# Patient Record
Sex: FEMALE | Race: WHITE | NOT HISPANIC OR LATINO | URBAN - METROPOLITAN AREA
[De-identification: names, ages, dates, MRNs, and addresses within clinical notes are randomized per-mention and may not be internally consistent; named-entity substitution may affect disease eponyms.]

---

## 2017-12-18 ENCOUNTER — AMBULATORY - HEALTHEAST (OUTPATIENT)
Dept: ADMINISTRATIVE | Facility: CLINIC | Age: 82
End: 2017-12-18

## 2017-12-18 RX ORDER — ASPIRIN 325 MG
325 TABLET, DELAYED RELEASE (ENTERIC COATED) ORAL DAILY
Status: SHIPPED | COMMUNITY
Start: 2017-12-16

## 2017-12-18 RX ORDER — ACETAMINOPHEN 325 MG/1
650 TABLET ORAL EVERY 4 HOURS PRN
Status: SHIPPED | COMMUNITY
Start: 2017-12-15

## 2017-12-18 RX ORDER — LIDOCAINE 50 MG/G
1 PATCH TOPICAL 2 TIMES DAILY
Status: SHIPPED | COMMUNITY
Start: 2017-12-16

## 2017-12-18 RX ORDER — DILTIAZEM HYDROCHLORIDE 120 MG/1
120 CAPSULE, COATED, EXTENDED RELEASE ORAL DAILY
Status: SHIPPED | COMMUNITY
Start: 2017-12-16

## 2017-12-18 RX ORDER — PANTOPRAZOLE SODIUM 40 MG/1
40 TABLET, DELAYED RELEASE ORAL DAILY
Status: SHIPPED | COMMUNITY
Start: 2017-12-16

## 2017-12-18 RX ORDER — DEXTROMETHORPHAN HBR. AND GUAIFENESIN 10; 100 MG/5ML; MG/5ML
10 SOLUTION ORAL EVERY 4 HOURS PRN
Status: SHIPPED | COMMUNITY
Start: 2017-12-15

## 2017-12-18 RX ORDER — I-VITE, TAB 1000-60-2MG (60/BT) 300MCG-200
1 TAB ORAL DAILY
Status: SHIPPED | COMMUNITY
Start: 2017-12-15

## 2017-12-19 ENCOUNTER — OFFICE VISIT - HEALTHEAST (OUTPATIENT)
Dept: GERIATRICS | Facility: CLINIC | Age: 82
End: 2017-12-19

## 2017-12-19 DIAGNOSIS — J96.90 RESPIRATORY FAILURE (H): ICD-10-CM

## 2017-12-19 DIAGNOSIS — I10 HTN (HYPERTENSION): ICD-10-CM

## 2017-12-19 DIAGNOSIS — I48.91 A-FIB (H): ICD-10-CM

## 2017-12-19 DIAGNOSIS — J44.9 COPD (CHRONIC OBSTRUCTIVE PULMONARY DISEASE) (H): ICD-10-CM

## 2017-12-26 ENCOUNTER — OFFICE VISIT - HEALTHEAST (OUTPATIENT)
Dept: GERIATRICS | Facility: CLINIC | Age: 82
End: 2017-12-26

## 2017-12-26 DIAGNOSIS — J18.9 PNEUMONIA: ICD-10-CM

## 2017-12-26 DIAGNOSIS — I10 HTN (HYPERTENSION): ICD-10-CM

## 2017-12-26 DIAGNOSIS — I48.91 A-FIB (H): ICD-10-CM

## 2017-12-26 DIAGNOSIS — J44.9 COPD (CHRONIC OBSTRUCTIVE PULMONARY DISEASE) (H): ICD-10-CM

## 2017-12-29 ENCOUNTER — OFFICE VISIT - HEALTHEAST (OUTPATIENT)
Dept: GERIATRICS | Facility: CLINIC | Age: 82
End: 2017-12-29

## 2017-12-29 DIAGNOSIS — J44.9 COPD (CHRONIC OBSTRUCTIVE PULMONARY DISEASE) (H): ICD-10-CM

## 2017-12-29 DIAGNOSIS — I10 HTN (HYPERTENSION): ICD-10-CM

## 2017-12-29 DIAGNOSIS — J18.9 PNEUMONIA: ICD-10-CM

## 2017-12-29 DIAGNOSIS — I48.91 A-FIB (H): ICD-10-CM

## 2018-01-02 ENCOUNTER — OFFICE VISIT - HEALTHEAST (OUTPATIENT)
Dept: GERIATRICS | Facility: CLINIC | Age: 83
End: 2018-01-02

## 2018-01-02 DIAGNOSIS — I48.91 A-FIB (H): ICD-10-CM

## 2018-01-02 DIAGNOSIS — J44.9 COPD (CHRONIC OBSTRUCTIVE PULMONARY DISEASE) (H): ICD-10-CM
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
## 2018-01-02 DIAGNOSIS — J96.90 RESPIRATORY FAILURE (H): ICD-10-CM

## 2018-01-05 ENCOUNTER — OFFICE VISIT - HEALTHEAST (OUTPATIENT)
Dept: GERIATRICS | Facility: CLINIC | Age: 83
End: 2018-01-05

## 2018-01-05 DIAGNOSIS — I48.91 A-FIB (H): ICD-10-CM

## 2018-01-05 DIAGNOSIS — J96.90 RESPIRATORY FAILURE (H): ICD-10-CM

## 2018-01-05 DIAGNOSIS — J44.9 COPD (CHRONIC OBSTRUCTIVE PULMONARY DISEASE) (H): ICD-10-CM

## 2018-01-05 DIAGNOSIS — R00.0 TACHYCARDIA: ICD-10-CM

## 2018-01-08 ENCOUNTER — RECORDS - HEALTHEAST (OUTPATIENT)
Dept: LAB | Facility: CLINIC | Age: 83
End: 2018-01-08

## 2018-01-09 ENCOUNTER — OFFICE VISIT - HEALTHEAST (OUTPATIENT)
Dept: GERIATRICS | Facility: CLINIC | Age: 83
End: 2018-01-09

## 2018-01-09 DIAGNOSIS — J44.9 COPD (CHRONIC OBSTRUCTIVE PULMONARY DISEASE) (H): ICD-10-CM

## 2018-01-09 DIAGNOSIS — I48.91 A-FIB (H): ICD-10-CM

## 2018-01-09 DIAGNOSIS — R53.1 GENERAL WEAKNESS: ICD-10-CM

## 2018-01-09 DIAGNOSIS — R09.02 HYPOXIA: ICD-10-CM

## 2018-01-09 DIAGNOSIS — I10 HYPERTENSION: ICD-10-CM

## 2018-01-09 LAB
ANION GAP SERPL CALCULATED.3IONS-SCNC: 8 MMOL/L (ref 5–18)
BUN SERPL-MCNC: 6 MG/DL (ref 8–28)
CALCIUM SERPL-MCNC: 8.2 MG/DL (ref 8.5–10.5)
CHLORIDE BLD-SCNC: 101 MMOL/L (ref 98–107)
CO2 SERPL-SCNC: 32 MMOL/L (ref 22–31)
CREAT SERPL-MCNC: 0.78 MG/DL (ref 0.6–1.1)
GFR SERPL CREATININE-BSD FRML MDRD: >60 ML/MIN/1.73M2
GLUCOSE BLD-MCNC: 228 MG/DL (ref 70–125)
POTASSIUM BLD-SCNC: 4 MMOL/L (ref 3.5–5)
SODIUM SERPL-SCNC: 141 MMOL/L (ref 136–145)

## 2018-01-12 ENCOUNTER — OFFICE VISIT - HEALTHEAST (OUTPATIENT)
Dept: GERIATRICS | Facility: CLINIC | Age: 83
End: 2018-01-12

## 2018-01-12 DIAGNOSIS — I10 HYPERTENSION: ICD-10-CM

## 2018-01-12 DIAGNOSIS — R09.02 HYPOXIA: ICD-10-CM

## 2018-01-12 DIAGNOSIS — J44.9 COPD (CHRONIC OBSTRUCTIVE PULMONARY DISEASE) (H): ICD-10-CM

## 2018-01-12 DIAGNOSIS — L30.9 DERMATITIS: ICD-10-CM

## 2018-01-16 ENCOUNTER — OFFICE VISIT - HEALTHEAST (OUTPATIENT)
Dept: GERIATRICS | Facility: CLINIC | Age: 83
End: 2018-01-16

## 2018-01-16 DIAGNOSIS — I48.91 A-FIB (H): ICD-10-CM

## 2018-01-16 DIAGNOSIS — I10 HYPERTENSION: ICD-10-CM

## 2018-01-16 DIAGNOSIS — J44.9 COPD (CHRONIC OBSTRUCTIVE PULMONARY DISEASE) (H): ICD-10-CM

## 2018-01-16 DIAGNOSIS — R09.02 HYPOXIA: ICD-10-CM

## 2018-01-19 ENCOUNTER — AMBULATORY - HEALTHEAST (OUTPATIENT)
Dept: GERIATRICS | Facility: CLINIC | Age: 83
End: 2018-01-19

## 2019-03-07 ENCOUNTER — RECORDS - HEALTHEAST (OUTPATIENT)
Dept: LAB | Facility: CLINIC | Age: 84
End: 2019-03-07

## 2019-03-08 LAB
ANION GAP SERPL CALCULATED.3IONS-SCNC: 9 MMOL/L (ref 5–18)
BUN SERPL-MCNC: 26 MG/DL (ref 8–28)
CALCIUM SERPL-MCNC: 8.5 MG/DL (ref 8.5–10.5)
CHLORIDE BLD-SCNC: 105 MMOL/L (ref 98–107)
CO2 SERPL-SCNC: 27 MMOL/L (ref 22–31)
CREAT SERPL-MCNC: 0.89 MG/DL (ref 0.6–1.1)
GFR SERPL CREATININE-BSD FRML MDRD: 59 ML/MIN/1.73M2
GLUCOSE BLD-MCNC: 123 MG/DL (ref 70–125)
HGB BLD-MCNC: 12.8 G/DL (ref 12–16)
POTASSIUM BLD-SCNC: 3.9 MMOL/L (ref 3.5–5)
SODIUM SERPL-SCNC: 141 MMOL/L (ref 136–145)

## 2019-03-11 ENCOUNTER — RECORDS - HEALTHEAST (OUTPATIENT)
Dept: LAB | Facility: CLINIC | Age: 84
End: 2019-03-11

## 2019-03-11 LAB
ANION GAP SERPL CALCULATED.3IONS-SCNC: 8 MMOL/L (ref 5–18)
BUN SERPL-MCNC: 20 MG/DL (ref 8–28)
CALCIUM SERPL-MCNC: 8.7 MG/DL (ref 8.5–10.5)
CHLORIDE BLD-SCNC: 105 MMOL/L (ref 98–107)
CO2 SERPL-SCNC: 30 MMOL/L (ref 22–31)
CREAT SERPL-MCNC: 0.99 MG/DL (ref 0.6–1.1)
GFR SERPL CREATININE-BSD FRML MDRD: 52 ML/MIN/1.73M2
GLUCOSE BLD-MCNC: 133 MG/DL (ref 70–125)
POTASSIUM BLD-SCNC: 4.3 MMOL/L (ref 3.5–5)
SODIUM SERPL-SCNC: 143 MMOL/L (ref 136–145)

## 2019-03-21 ENCOUNTER — RECORDS - HEALTHEAST (OUTPATIENT)
Dept: LAB | Facility: CLINIC | Age: 84
End: 2019-03-21

## 2019-03-21 LAB
BASOPHILS # BLD AUTO: 0.1 THOU/UL (ref 0–0.2)
BASOPHILS NFR BLD AUTO: 1 % (ref 0–2)
EOSINOPHIL # BLD AUTO: 0.1 THOU/UL (ref 0–0.4)
EOSINOPHIL NFR BLD AUTO: 1 % (ref 0–6)
ERYTHROCYTE [DISTWIDTH] IN BLOOD BY AUTOMATED COUNT: 13.2 % (ref 11–14.5)
FLUAV AG SPEC QL IA: NORMAL
FLUBV AG SPEC QL IA: NORMAL
HCT VFR BLD AUTO: 43.9 % (ref 35–47)
HGB BLD-MCNC: 14 G/DL (ref 12–16)
LYMPHOCYTES # BLD AUTO: 1.1 THOU/UL (ref 0.8–4.4)
LYMPHOCYTES NFR BLD AUTO: 14 % (ref 20–40)
MCH RBC QN AUTO: 33.4 PG (ref 27–34)
MCHC RBC AUTO-ENTMCNC: 31.9 G/DL (ref 32–36)
MCV RBC AUTO: 105 FL (ref 80–100)
MONOCYTES # BLD AUTO: 1.3 THOU/UL (ref 0–0.9)
MONOCYTES NFR BLD AUTO: 16 % (ref 2–10)
NEUTROPHILS # BLD AUTO: 5.4 THOU/UL (ref 2–7.7)
NEUTROPHILS NFR BLD AUTO: 68 % (ref 50–70)
PLATELET # BLD AUTO: 133 THOU/UL (ref 140–440)
PMV BLD AUTO: 12.5 FL (ref 8.5–12.5)
RBC # BLD AUTO: 4.19 MILL/UL (ref 3.8–5.4)
WBC: 8 THOU/UL (ref 4–11)

## 2021-06-14 NOTE — PROGRESS NOTES
[facility]      Facility:    [facility] [[id number]]  Code Status: FULL CODE      Chief Complaint/Reason for Visit:  Chief Complaint   Patient presents with     H & P       HPI:   Carey is a 89 y.o. female who was admitted to [facility] on December 8 and transferred to this facility December 15, 2017.  She was admitted on account of respiratory failure.  Has a known history of hypertension and smoking.  Unsure of previous objective diagnosis of COPD however her home medication did not include any inhalers or nebulizers.  She has lived in her house that she lived in since 1948.  She lives by herself.  She has a total of 7 kids.  She does some on and off volunteering work.    She was admitted secondary to respiratory failure secondary to acute viral infection.  She was also found in atrial fibrillation that subsequently converted after 2 doses of Cardizem IV.  She was placed on BiPAP noninvasive ventilation.    Started on nebulizers steroids and doxycycline.  Was able to be weaned from BiPAP however remains on 1 L of oxygen per nasal cannula.  She was seen by pulmonary and started her on Triny and INcruse     Started prednisone 40 mg tapering doses,     She was discharged from the hospital on tapering doses of prednisone doxycycline for the next 3 days and was kept on diltiazem 120 mg per day.EF normal    She reports having received her flu shot this year.  She denies using oxygen at home she denies ever needing any oxygen.    She still has a wet cough.  Able to bring up her phlegm.  She has an incentive spirometer on her bedside, when I asked her to use it, she is not quite sure how to use it properly.  Education was given.    She does feel short of breath with physical therapy.  Otherwise she feels okay at rest.  She was speaking in full sentences.    Past Medical History:  Past Medical History:   Diagnosis Date     Adjustment disorder with depressed mood       ARF (acute respiratory failure)      Atrial fibrillation with RVR      Alarcon esophagus      COPD with acute exacerbation      HTN (hypertension)      Hyperglycemia      Macrocytosis without anemia      Obesity      SOB (shortness of breath)      Stress incontinence      Thrombocytopenia      Tobacco use      Vitamin D deficiency            Surgical History:  Past Surgical History:   Procedure Laterality Date     APPENDECTOMY       CATARACT EXTRACTION Right 02/25/2010     PARTIAL HYSTERECTOMY  1969    ovariectomy        Family History:   Family History   Problem Relation Age of Onset     Hypertension Mother      Lung cancer Mother      Cancer Maternal Grandmother      Heart disease Maternal Grandfather      Stomach cancer Paternal Grandmother      Diabetes Paternal Grandfather        Social History:    Social History     Social History     Marital status:      Spouse name: N/A     Number of children: N/A     Years of education: N/A     Social History Main Topics     Smoking status: Current Every Day Smoker     Types: Cigarettes     Smokeless tobacco: Never Used     Alcohol use 8.4 oz/week     14 Standard drinks or equivalent per week      Comment: daily wine with dinner      Drug use: No     Sexual activity: Not Currently     Other Topics Concern     Not on file     Social History Narrative     No narrative on file          Review of Systems  Denies any chest pains or palpitations.  Denies any dizziness.  Eating well.  No bowel or bladder issues.  The rest of the review of systems negative.  There were no vitals filed for this visit.    Physical Exam  Vital signs noted she is saturating 95% on 1 L of oxygen.  Patient is alert, awake, oriented to time, place and person, not in acute cardiorespiratory distress  Skin: Warm, and moist,  no lesions,   Head: atraumatic, normocephalic,   Eyes: EOM's intact and conjugate, pink palpebral conjunctivae, anicteric sclerae, pupils reactive  Lungs : Decreased but clear to  auscultation , no crackles, wheezes or rhonchi  Heart : Nornal first and second heart sounds, No murmurs, heaves, or thrills  Abdomen: Soft, non tender, non distended, no hepatosplenomegaly, no ascites  Extremities: No edema, pulses are full and equal      Medication List:  Current Outpatient Prescriptions   Medication Sig     acetaminophen (TYLENOL) 325 MG tablet Take 650 mg by mouth every 4 (four) hours as needed.     albuterol (PROVENTIL) 2.5 mg /3 mL (0.083 %) nebulizer solution Inhale 2.5 mg 4 (four) times a day.     aspirin 325 MG EC tablet Take 325 mg by mouth daily.     dextromethorphan-guaiFENesin (ROBITUSSIN-DM)  mg/5 mL liquid Take 10 mL by mouth every 4 (four) hours as needed.     diltiazem (CARDIZEM CD) 120 MG 24 hr capsule Take 120 mg by mouth daily.     fluticasone-vilanterol (BREO ELLIPTA) 200-25 mcg/dose DsDv inhaler Inhale 1 puff daily.     lidocaine (LIDODERM) 5 % Place 1 patch on the skin 2 (two) times a day.     lisinopril (PRINIVIL,ZESTRIL) 40 MG tablet Take 40 mg by mouth daily.     magnesium hydroxide (MILK OF MAG) 400 mg/5 mL Susp suspension Take 15 mL by mouth daily as needed.     nicotine polacrilex (NICORETTE) 4 MG gum Take 4 mg by mouth every hour as needed.     pantoprazole (PROTONIX) 40 MG tablet Take 40 mg by mouth daily.     umeclidinium (INCRUSE ELLIPTA) 62.5 mcg/actuation DsDv inhaler Inhale 1 puff daily.     vit A,C and E-lutein-minerals 1,000 unit-200 mg-60 unit-2 mg Tab Take 1 tablet by mouth daily.       Labs:  No results found for this or any previous visit (from the past 72 hour(s)).      Assessment:    ICD-10-CM    1. COPD (chronic obstructive pulmonary disease) J44.9    2. A-fib I48.91    3. Respiratory failure J96.90    4. HTN (hypertension) I10        Plan:  She would need a lot of education from nursing staff on how to use nebs, and her new inhalers Triny in in tushar.  Wean oxygen to maintain saturation between 88-91%.  But will likely need it at night.  Her heart  rhythm is regular during my examination.  Continue with Cardizem 120 mg p.o. daily.  Not on any anticoagulation.  Chads score 3 blood pressure has been on the lower side.  Decrease lisinopril from 40 mg to 20 mg per day.  Please note that at home she was taking 10 mg daily.  She needs a lot of coaching with the use of incentive spirometer I had asked the nursing staff to do so.  She needs to do her incentive spirometer exercises at least 2 times a day.  She needs to quit smoking.    Total time spent was 60 minutes with more than 50% spent on counseling, discussion of treatment plan and extensive review of available records  This note has been dictated using voice recognition software. Any grammatical, typographical, or context distortions are unintentional.        Electronically signed by: Mari Ferguson MD

## 2021-06-15 NOTE — PROGRESS NOTES
[facility]    [facility][id number][facility]   Code Status: FULL CODE      CHIEF COMPLAINT/REASON FOR VISIT:  Chief Complaint   Patient presents with     Follow Up     rehab,       HISTORY:      HPI: Carey is a 89 y.o. female who I had a chance to visit with secondary to her hospitalization December 8, 2017 secondary to respiratory failure with a history of hypertension smoker COPD generalized weakness.  She was admitted secondary to respiratory failure with an acute viral infection also found to be in atrial fibrillation she was cardioverted ×2 placed on BiPAP showed a nebulizations.  She currently has been normotensive and afebrile.  Does have a irregularity is on oxygen per nasal cannula 1 or 2 L.  She is legally blind.  She is participating with therapy can offer her at this time.  There is some cognitive impairment.  Her lungs overall are clear denies any cough or other respiratory complications.    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      Obesity      SOB (shortness of breath)      Stress incontinence      Thrombocytopenia      Tobacco use      Vitamin D deficiency              Family History   Problem Relation Age of Onset     Hypertension Mother      Lung cancer Mother      Cancer Maternal Grandmother      Heart disease Maternal Grandfather      Stomach cancer Paternal Grandmother      Diabetes Paternal Grandfather      Social History     Social History     Marital status:      Spouse name: N/A     Number of children: N/A     Years of education: N/A     Social History Main Topics     Smoking status: Current Every Day Smoker     Types: Cigarettes     Smokeless tobacco: Never Used     Alcohol use 8.4  oz/week     14 Standard drinks or equivalent per week      Comment: daily wine with dinner      Drug use: No     Sexual activity: Not Currently     Other Topics Concern     Not on file     Social History Narrative     No narrative on file         Review of Systems  Currently no reports of any fever chills increased fatigue rashes sores or myalgias arthralgias or headache.  A history of hypertension COPD atrial fibrillation generalized weakness hypoxia.      Current Outpatient Prescriptions:      acetaminophen (TYLENOL) 325 MG tablet, Take 650 mg by mouth every 4 (four) hours as needed., Disp: , Rfl:      albuterol (PROVENTIL) 2.5 mg /3 mL (0.083 %) nebulizer solution, Inhale 2.5 mg 4 (four) times a day., Disp: , Rfl:      aspirin 325 MG EC tablet, Take 325 mg by mouth daily., Disp: , Rfl:      dextromethorphan-guaiFENesin (ROBITUSSIN-DM)  mg/5 mL liquid, Take 10 mL by mouth every 4 (four) hours as needed., Disp: , Rfl:      diltiazem (CARDIZEM CD) 120 MG 24 hr capsule, Take 120 mg by mouth daily., Disp: , Rfl:      lidocaine (LIDODERM) 5 %, Place 1 patch on the skin 2 (two) times a day., Disp: , Rfl:      magnesium hydroxide (MILK OF MAG) 400 mg/5 mL Susp suspension, Take 15 mL by mouth daily as needed., Disp: , Rfl:      nicotine polacrilex (NICORETTE) 4 MG gum, Take 4 mg by mouth every hour as needed., Disp: , Rfl:      pantoprazole (PROTONIX) 40 MG tablet, Take 40 mg by mouth daily., Disp: , Rfl:      vit A,C and E-lutein-minerals 1,000 unit-200 mg-60 unit-2 mg Tab, Take 1 tablet by mouth daily., Disp: , Rfl:     .There were no vitals filed for this visit.  Blood pressure 120/77 pulse 82 respirations 18 temperature 97.9 saturation 3 L 94%  Physical Exam   HENT:   Head: Normocephalic.   Eyes: Pupils are equal, round, and reactive to light.   Neck: Neck supple. No thyromegaly present.   Cardiovascular: Normal rate, regular rhythm and normal heart sounds.    PVC   Pulmonary/Chest: Breath sounds normal.    Abdominal: Bowel sounds are normal. There is no tenderness. There is no guarding.   Musculoskeletal:   Generalized weakness.  Wheelchair.  Denies pain   Lymphadenopathy:     She has no cervical adenopathy.   Neurological: She is alert.   Skin: Skin is warm and dry. No rash noted.         LABS:   No results found for: WBC, HGB, HCT, MCV, PLT  No results found for this or any previous visit.          ASSESSMENT:      ICD-10-CM    1. Hypertension I10    2. COPD (chronic obstructive pulmonary disease) J44.9    3. A-fib I48.91    4. Hypoxia R09.02    5. General weakness R53.1        PLAN:    At this time no further changes are necessary.  She does have a BMP ordered for today the results not yet back by this dictation.  The nebs do seem to be helpful diltiazem 120 mg seems to be just fine.  There were no other problems with this visit did encourage her therapy.    Electronically signed by: Michael Duane Johnson, CNP

## 2021-06-15 NOTE — PROGRESS NOTES
[facility]    Facility:   [facility] [[id number]]   Code Status: FULL CODE      CHIEF COMPLAINT/REASON FOR VISIT:  Chief Complaint   Patient presents with     Review Of Multiple Medical Conditions       HISTORY:      HPI: Carey is a 89 y.o. female seen today in the presence of nursing staff.  Discussed with occupational therapist as well.  Louisa is not doing very well in terms of progressing towards going back home.  She continues to work with physical therapy and Occupational Therapy.  Occupational Therapy feels that she needs 24-hour supervision from now on.  Cognition is not that great.  She still drops her saturation down to mid 70s, and then picks up shortly after being reminded to take deep breaths.  She denies any increased shortness of breath.  She denies any chest pains or palpitations.  She was seen earlier at the breakfast table eating  Her breakfast by herself.  She does seem comfortable.    Later on I was told that last night she somewhat slid down from her wheelchair and was seen on the floor.  No apparent injury    Still on 3 L of oxygen.  I do not think that she has been doing her incentive spirometer.  Nebs are as needed    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      Obesity      SOB (shortness of breath)      Stress incontinence      Thrombocytopenia      Tobacco use      Vitamin D deficiency              Family History   Problem Relation Age of Onset     Hypertension Mother      Lung cancer Mother      Cancer Maternal Grandmother      Heart disease Maternal Grandfather      Stomach cancer Paternal Grandmother      Diabetes Paternal Grandfather      Social History     Social History     Marital status:       Spouse name: N/A     Number of children: N/A     Years of education: N/A     Social History Main Topics     Smoking status: Current Every Day Smoker     Types: Cigarettes     Smokeless tobacco: Never Used     Alcohol use 8.4 oz/week     14 Standard drinks or equivalent per week      Comment: daily wine with dinner      Drug use: No     Sexual activity: Not Currently     Other Topics Concern     Not on file     Social History Narrative     No narrative on file         Review of Systems  As above  .There were no vitals filed for this visit.    Physical Exam  Vital signs noted  Patient is alert, awake, oriented to time, place and person, not in acute cardiorespiratory distress  Skin: Warm, and moist,  no lesions,   Head: atraumatic, normocephalic,   Eyes: EOM's intact and conjugate, pink palpebral conjunctivae, anicteric sclerae, pupils reactive  Lungs : Decreased but clear to auscultation , no crackles, wheezes or rhonchi  Heart : Nornal first and second heart sounds, No murmurs, heaves, or thrills  Abdomen: Soft, non tender, non distended, no hepatosplenomegaly, no ascites  Extremities: No increase in edema, pulses are full and equal      LABS:   No results found for this or any previous visit (from the past 72 hour(s)).      ASSESSMENT:      ICD-10-CM    1. COPD (chronic obstructive pulmonary disease) J44.9    2. Respiratory failure J96.90    3. A-fib I48.91    4. Fall W19.XXXA        PLAN:    I will make her nebulizers scheduled change albuterol to DuoNeb's.  We did some incentive spirometer exercises in front of me, educated her about its use, asked the staff to supervise her twice a day.  Heart rate is still in the 90s 88 is the lowest I have seen in the last few days continue with diltiazem 120 mg.  Her lisinopril has been discontinued last week.  We will need at least 24 hour supervision going forward.  At the current time she lives by herself in her own home.    Total 35 minutes of which 55% was  spent counseling and coordination of care of the above plan.    Electronically signed by: Mari Ferguson MD

## 2021-06-15 NOTE — PROGRESS NOTES
[facility]    [facility] [[id number][facility]   Code Status: FULL CODE  PCP: Gonsalo Hernandez MD   Phone: [phone]   Fax: [phone]      CHIEF COMPLAINT/REASON FOR VISIT:  Chief Complaint   Patient presents with     Discharge Summary       HISTORY COURSE:  Carey is a 89 y.o. female who was admitted to [facility] on December 8 and transferred to this facility December 15, 2017.  She was admitted on account of respiratory failure.  Has a known history of hypertension and smoking.  Unsure of previous objective diagnosis of COPD however her home medication did not include any inhalers or nebulizers.  She has lived in her house that she lived in since 1948.  She lives by herself.  She has a total of 7 kids.  She does some on and off volunteering work.     She was admitted secondary to respiratory failure secondary to acute viral infection.  She was also found in atrial fibrillation that subsequently converted after 2 doses of Cardizem IV.  She was placed on BiPAP noninvasive ventilation.     Started on nebulizers steroids and doxycycline.  Was able to be weaned from BiPAP however remains on 1 L of oxygen per nasal cannula.  She was seen by pulmonary and started her on Triny and INcruse      Started prednisone 40 mg tapering doses,      She was discharged from the hospital on tapering doses of prednisone doxycycline for the next 3 days and was kept on diltiazem 120 mg per day.EF normal    She has been able to participate with the rehabilitation services and overall has benefited from the rehabilitation.  She has been normotensive and afebrile.  She currently is on Tamiflu prophylactically due to influenza outbreak in the transitional care unit.  During her stay the lisinopril was discontinued and to continue with the diltiazem 120 mg.  Attempts have been made to wean her off oxygen also attempts were made to do incentive spirometry.  Review of  Systems  Currently no reports of any fever chills increased fatigue rashes sores or myalgias arthralgias or headache.  A history of hypertension COPD atrial fibrillation generalized weakness hypoxia.  Vitals:    01/16/18 1045   BP: 128/73   Pulse: 86   Resp: 16   Temp: 97.4  F (36.3  C)   SpO2: 91%       Physical Exam  HENT:   Head: Normocephalic.    Neck: Neck supple. No thyromegaly present.   Cardiovascular: Normal rate, regular rhythm and normal heart sounds.    PVC   Pulmonary/Chest: Breath sounds normal.   Abdominal: Bowel sounds are normal. There is no tenderness. There is no guarding.   Musculoskeletal:   Generalized weakness.  Wheelchair.  Denies pain   Lymphadenopathy:     She has no cervical adenopathy.   No results found for: WBC, HGB, HCT, MCV, PLT  No results found for this or any previous visit.        MEDICATION LIST:  Current Outpatient Prescriptions   Medication Sig     acetaminophen (TYLENOL) 325 MG tablet Take 650 mg by mouth every 4 (four) hours as needed.     aspirin 325 MG EC tablet Take 325 mg by mouth daily.     dextromethorphan-guaiFENesin (ROBITUSSIN-DM)  mg/5 mL liquid Take 10 mL by mouth every 4 (four) hours as needed.     diltiazem (CARDIZEM CD) 120 MG 24 hr capsule Take 120 mg by mouth daily.     lidocaine (LIDODERM) 5 % Place 1 patch on the skin 2 (two) times a day.     magnesium hydroxide (MILK OF MAG) 400 mg/5 mL Susp suspension Take 15 mL by mouth daily as needed.     nicotine polacrilex (NICORETTE) 4 MG gum Take 4 mg by mouth every hour as needed.     pantoprazole (PROTONIX) 40 MG tablet Take 40 mg by mouth daily.     vit A,C and E-lutein-minerals 1,000 unit-200 mg-60 unit-2 mg Tab Take 1 tablet by mouth daily.       DISCHARGE DIAGNOSIS:    ICD-10-CM    1. COPD (chronic obstructive pulmonary disease) J44.9    2. A-fib I48.91    3. Hypertension I10    4. Hypoxia R09.02        MEDICAL EQUIPMENT NEEDS:  Walker    DISCHARGE PLAN/FACE TO FACE:  I certify that services are/were  furnished while this patient was under the care of a physician and that a physician or an allowed non-physician practitioner (NPP), had a face-to-face encounter that meets the physician face-to-face encounter requirements. The encounter was in whole, or in part, related to the primary reason for home health. The patient is confined to his/her home and needs intermittent skilled nursing, physical therapy, speech-language pathology, or the continued need for occupational therapy. A plan of care has been established by a physician and is periodically reviewed by a physician.  Date of Face-to-Face Encounter: January 16, 2018    I certify that, based on my findings, the following services are medically necessary home health services: Discharging to home with current medications will also require [facility] for physical and occupational therapy home health aide and nursing    My clinical findings support the need for the above skilled services because: (Please write a brief narrative summary that describes what the RN, PT, SLP, or other services will be doing in the home. A list of diagnoses in this section does not meet the CMS requirements.)  Secondary to generalized debility and medication management    This patient is homebound because: (Please write a brief narrative summary describing the functional limitations as to why this patient is homebound and specifically what makes this patient homebound.)  Secondary to multiple chronic comorbid conditions    The patient is, or has been, under my care and I have initiated the establishment of the plan of care. This patient will be followed by a physician who will periodically review the plan of care.  She will follow-up with her primary care doctor regarding medication management and any future laboratory studies.  Continue to manage and follow her chronic medical conditions.  She can use oxygen as needed.  Finishing up the Tamiflu on January 20  prophylactically.  She is on a level for diet with thin liquids.    Upon discharge she will also require oxygen use due to testing and requiring continuous use of oxygen per nasal cannula her saturations with oxygen 95% resting ambulating without oxygen 84% and nighttime testing 81% secondary to her history of COPD.    Discharge coordination of care greater than 30 minutes.    Electronically signed by: Michael Duane Johnson, CNP

## 2021-06-15 NOTE — PROGRESS NOTES
[facility]    Facility:   [facility] [[id number]]   Code Status: FULL CODE      CHIEF COMPLAINT/REASON FOR VISIT:  Chief Complaint   Patient presents with     Review Of Multiple Medical Conditions       HISTORY:      HPI: Carey is a 89 y.o. female seen today in close follow-up of her multiple issues.  She was admitted to the hospital with COPD exacerbation likely secondary to end of acute viral illness.  Was placed on oxygen on transfer to transitional care unit.  She was also started on Breo Ellipta as well asIncruse.  She developed atrial fibrillation while in the hospital and has been placed on Cardizem to keep heart rate below 100.    She did decline and developed pneumonia last week.  She is now finishing up on her Levaquin.  Her respiratory status is getting better.  However she is still feeling very tired.  I did have a long conversation with daughter January.  Mom has smoked all her life.  At her baseline, she does breathe heavily and in fact would tend to wheeze audibly.  But she did not have any symptoms at all despite wheezing.  January has requested that the new Brio elliptical and incruse be discontinued from her medication list as there is some evidence of potential for respiratory infections with the use of these inhalers.  She has reason to believe that she contracted pneumonia because of these 2 inhalers.  Moreover we also talked about Louisa's atrial fibrillation and blood pressure.  She reported that Louisa was not on any blood pressure medication prior to going into the hospital.  I specifically asked about her lisinopril as it was noted in the records that she was taking lisinopril 10 mg at home which was increased to 20 mg in the hospital.  She denied that her mother took this and home setting.  Blood pressures have been in the 110s.  She reported that her baseline blood pressure is around 140-150 which her primary care physician was not very  concerned about considering her history of carotid artery stenosis to allow adequate cerebral circulation.    Heart rate however is still bordering on 100.  When I talked to Louisa today, she was answering my questions appropriately.  Her oxygen requirement has dropped now to 1.5 which is an improvement.  I can still hear her wheezing audibly but she denies any symptoms at all denies any dyspnea on exertion.  She does feel tired.  Appetite is fair.    No increased edema.  No significant weight gain.      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      Obesity      SOB (shortness of breath)      Stress incontinence      Thrombocytopenia      Tobacco use      Vitamin D deficiency              Family History   Problem Relation Age of Onset     Hypertension Mother      Lung cancer Mother      Cancer Maternal Grandmother      Heart disease Maternal Grandfather      Stomach cancer Paternal Grandmother      Diabetes Paternal Grandfather      Social History     Social History     Marital status:      Spouse name: N/A     Number of children: N/A     Years of education: N/A     Social History Main Topics     Smoking status: Current Every Day Smoker     Types: Cigarettes     Smokeless tobacco: Never Used     Alcohol use 8.4 oz/week     14 Standard drinks or equivalent per week      Comment: daily wine with dinner      Drug use: No     Sexual activity: Not Currently     Other Topics Concern     Not on file     Social History Narrative     No narrative on file         Review of Systems  As above otherwise unremarkable  .There were no vitals filed for this visit.    Physical Exam  Vital signs noted.  Patient is alert, awake, oriented to time, place and person, not in acute  cardiorespiratory distress  Skin: Warm, and moist,  no lesions, no cyanosis  Head: atraumatic, normocephalic,   Eyes: EOM's intact and conjugate, pink palpebral conjunctivae, anicteric sclerae, pupils reactive  Lungs : Decreased but clear to auscultation , no crackles, audible wheeze of the upper airways however none of the lungs.  No  rhonchi  Heart : Heart rate  regular Nornal first and second heart sounds, No murmurs, heaves, or thrills  Abdomen: Soft, non tender, non distended, no hepatosplenomegaly, no ascites  Extremities: No edema, pulses are full and equal      LABS:   No results found for this or any previous visit (from the past 72 hour(s)).      ASSESSMENT:      ICD-10-CM    1. COPD (chronic obstructive pulmonary disease) J44.9    2. Pneumonia J18.9    3. A-fib I48.91    4. HTN (hypertension) I10        PLAN:    After long discussion with nursing staff, patient and patient's daughter January, it came to my attention that patient has not/refused to take her Cardizem lisinopril and the 2 inhalers.  With the instructions of the daughter.  We will plan on the followin.  Discontinue Triny lift and in marvel    2.  Maintain oxygen and titrate to a saturation between 89-92%.  3.  Discontinue lisinopril.  4.  Continue diltiazem at 120 mg p.o. daily.  #5 continue full dose aspirin for atrial fibrillation.  6.  Daughter requested referral to audiologist because of patient's severe hard of hearing that she believes is affecting her functional abilities.      7.  She asked about pulmonary consultation and testing for COPD on a more objective basis.  I had recommended that we allow her time to recover and to get back to her baseline before seeing the pulmonologist.  45    Total 45 minutes of which 70% was spent counseling and coordination of care of the above plan.    Electronically signed by: Mari Ferguson MD

## 2021-06-15 NOTE — PROGRESS NOTES
[facility]    Facility:   [facility] [[id number]]   Code Status: FULL CODE      CHIEF COMPLAINT/REASON FOR VISIT:  Chief Complaint   Patient presents with     Review Of Multiple Medical Conditions       HISTORY:      HPI: Carey is a 89 y.o. female who was seen today in follow-up of her multiple issues.  Her case was discussed with RN staff as well as physical therapy.  RN staff reports that she is getting better in terms of her mental clarity.  She is also moving faster.  However her saturation tends to drop with activity.  Physical therapy was walking with her about 10 feet and she stopped to sit down her saturations would go down into the mid 80s and slowly .  This is on 3 L of oxygen per nasal cannula.  She does however denies any shortness of breath.  She does state that this is her normal way of breathing.  She has not had any recurrence of fevers or increased cough.  She does get her nebulizers 4 times a day.      Still borderline tachycardic in the mid to higher 90s.  Blood pressures have been stable.130s-140s with a single measurement of 103/68.      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      Obesity      SOB (shortness of breath)      Stress incontinence      Thrombocytopenia      Tobacco use      Vitamin D deficiency              Family History   Problem Relation Age of Onset     Hypertension Mother      Lung cancer Mother      Cancer Maternal Grandmother      Heart disease Maternal Grandfather      Stomach cancer Paternal Grandmother      Diabetes Paternal Grandfather      Social History     Social History     Marital status:      Spouse name: N/A     Number of children: N/A     Years of education:  N/A     Social History Main Topics     Smoking status: Current Every Day Smoker     Types: Cigarettes     Smokeless tobacco: Never Used     Alcohol use 8.4 oz/week     14 Standard drinks or equivalent per week      Comment: daily wine with dinner      Drug use: No     Sexual activity: Not Currently     Other Topics Concern     Not on file     Social History Narrative     No narrative on file         Review of Systems  As above otherwise unremarkable.  She has been talking in full sentences.  .There were no vitals filed for this visit.    Physical Exam  Saturation is 93% on 3 L per nasal cannula.  She is speaking in full sentences.  Patient is alert, awake, oriented to time, place and person, not in acute cardiorespiratory distress  Skin: Warm, and moist,  no lesions,   Head: atraumatic, normocephalic,   Eyes: EOM's intact and conjugate, pink palpebral conjunctivae, anicteric sclerae, pupils reactive  Lungs : Decreased but clear to auscultation , no crackles, wheezes or rhonchi  Heart : Borderline tachycardic 99/min Nornal first and second heart sounds, No murmurs, heaves, or thrills  Abdomen: Soft, non tender, non distended, no hepatosplenomegaly, no ascites  Extremities: No change in edema, pulses are full and equal      LABS:   No results found for this or any previous visit (from the past 72 hour(s)).      ASSESSMENT:      ICD-10-CM    1. COPD (chronic obstructive pulmonary disease) J44.9    2. Respiratory failure J96.90    3. A-fib I48.91    4. Tachycardia R00.0        PLAN:    Continue with current physical therapy and Occupational Therapy.  She will need 24 hour supervision going forward.  I am unable to further decrease her diltiazem as was requested by daughter because of her tachycardia especially with activity.  She will likely need to discharge on oxygen.  She is not on any anticoagulation except for a full dose aspirin for her atrial fibrillation.  Recheck a set of metabolic profile for medication  management.        Total 25 minutes of which 55% was spent counseling and coordination of care of the above plan.    Electronically signed by: Mari Ferguson MD

## 2021-06-15 NOTE — PROGRESS NOTES
[facility]    Facility:   [facility] [[id number]]   Code Status: FULL CODE      CHIEF COMPLAINT/REASON FOR VISIT:  Chief Complaint   Patient presents with     Review Of Multiple Medical Conditions       HISTORY:      HPI: Carey is a 89 y.o. female Who I saw today in follow-up of her multiple medical problems.  She is a very nice 89-year-old female who was admitted recently to [facility] on December 8 secondary to COPD exacerbation.  She was also found to be in atrial fibrillation and converted to sinus after Cardizem IV treatment.  Long-acting beta agonist and anticholinergic started and she was placed on prednisone tapering doses and doxycycline.    Over the weekend she had more trouble with her respirations a chest x-ray was eventually obtained and noted right basilar and right upper lobe infiltrates.  She was then started on Levaquin for 10 days.    She was seen today in her wheelchair.  She does not seem to be uncomfortable.  She is still on oxygen at 3 L.  However saturation has been up to 95%.  I have not heard staff stating she has been coughing more.  No fevers on the E HR.    She denies any difficulty breathing.  She wants to go home soon.  She has been working with physical therapy    Unsure if she is working with speech therapy as well.  Staff has not mentioned any trouble with choking or coughing during meals.  Blood pressure and heart rate have been stable since initiation of lisinopril and diltiazem.  No hypotension.    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      Obesity      SOB (shortness of breath)      Stress incontinence      Thrombocytopenia      Tobacco use      Vitamin D deficiency               Family History   Problem Relation Age of Onset     Hypertension Mother      Lung cancer Mother      Cancer Maternal Grandmother      Heart disease Maternal Grandfather      Stomach cancer Paternal Grandmother      Diabetes Paternal Grandfather      Social History     Social History     Marital status:      Spouse name: N/A     Number of children: N/A     Years of education: N/A     Social History Main Topics     Smoking status: Current Every Day Smoker     Types: Cigarettes     Smokeless tobacco: Never Used     Alcohol use 8.4 oz/week     14 Standard drinks or equivalent per week      Comment: daily wine with dinner      Drug use: No     Sexual activity: Not Currently     Other Topics Concern     Not on file     Social History Narrative     No narrative on file         Review of Systems  Unremarkable  .There were no vitals filed for this visit.    Physical Exam  Vital signs noted and stable 97% on 3 L per nasal cannula  Patient is alert, awake, oriented to time, place and person, not in acute cardiorespiratory distress  Skin: Warm, and moist,  no lesions,   Head: atraumatic, normocephalic,   Eyes: EOM's intact and conjugate, pink palpebral conjunctivae, anicteric sclerae, pupils reactive  Lungs : Decreased but clear to auscultation , no crackles, wheezes or rhonchi  Heart : Nornal first and second heart sounds, No murmurs, heaves, or thrills  Abdomen: Soft, non tender, non distended, no hepatosplenomegaly, no ascites  Extremities: No change in edema, pulses are full and equal      LABS:   No results found for this or any previous visit (from the past 72 hour(s)).      ASSESSMENT:      ICD-10-CM    1. COPD (chronic obstructive pulmonary disease) J44.9    2. Pneumonia J18.9    3. A-fib I48.91    4. HTN (hypertension) I10        PLAN:    Continue with inhalers Briol and in tushar finish Levaquin.  I will have speech therapy work with her to see if there is any chance that she might be aspirating causing  pneumonia  Continue aspirin full dose daily for atrial fibrillation.  Continue to work with physical therapy and Occupational Therapy.  Titrate oxygen check resting and ambulatory O2 saturation    Total 25 minutes of which 55% was spent counseling and coordination of care of the above plan.    Electronically signed by: Mari Ferguson MD

## 2021-06-15 NOTE — PROGRESS NOTES
[facility]    Facility:   [facility] [[id number]]   Code Status: FULL CODE      CHIEF COMPLAINT/REASON FOR VISIT:  Chief Complaint   Patient presents with     Follow Up     rehab, rash       HISTORY:      HPI: Carey is a 89 y.o. female who I had the chance to revisit with today secondary to her hospitalization December 8 secondary to respiratory failure with a history of hypertension COPD and generalized weakness.  She currently is still on oxygen 1 or 2 L per nasal cannula.  She still has generalized weakness and debility.  I do not know what therapy is really doing with her at this time to get her back to have independence.  She does have A. fib was put on diltiazem.  She has been normotensive and she also has been afebrile.  The other day she did develop a rash and after further investigation it was noted that she was or at least the family has been using a certain kind of lotion or sounds like even astringent on her back and chest area and that is where she did develop the rash it is slowly going away with not using that particular lotion or astringent as well as giving her Benadryl 25 mg every 6 hours as needed for the pruritus and also we did ask them to give her a good sponge bath.  She does have generalized weakness.  Appetite is only fair.  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      Obesity      SOB (shortness of breath)      Stress incontinence      Thrombocytopenia      Tobacco use      Vitamin D deficiency              Family History   Problem Relation Age of Onset     Hypertension Mother      Lung cancer Mother      Cancer Maternal Grandmother      Heart disease Maternal Grandfather       Stomach cancer Paternal Grandmother      Diabetes Paternal Grandfather      Social History     Social History     Marital status:      Spouse name: N/A     Number of children: N/A     Years of education: N/A     Social History Main Topics     Smoking status: Current Every Day Smoker     Types: Cigarettes     Smokeless tobacco: Never Used     Alcohol use 8.4 oz/week     14 Standard drinks or equivalent per week      Comment: daily wine with dinner      Drug use: No     Sexual activity: Not Currently     Other Topics Concern     Not on file     Social History Narrative     No narrative on file         Review of Systems  Currently no reports of any fever chills increased fatigue rashes sores or myalgias arthralgias or headache.  A history of hypertension COPD atrial fibrillation generalized weakness hypoxia.        Current Outpatient Prescriptions:      acetaminophen (TYLENOL) 325 MG tablet, Take 650 mg by mouth every 4 (four) hours as needed., Disp: , Rfl:      albuterol (PROVENTIL) 2.5 mg /3 mL (0.083 %) nebulizer solution, Inhale 2.5 mg 4 (four) times a day., Disp: , Rfl:      aspirin 325 MG EC tablet, Take 325 mg by mouth daily., Disp: , Rfl:      dextromethorphan-guaiFENesin (ROBITUSSIN-DM)  mg/5 mL liquid, Take 10 mL by mouth every 4 (four) hours as needed., Disp: , Rfl:      diltiazem (CARDIZEM CD) 120 MG 24 hr capsule, Take 120 mg by mouth daily., Disp: , Rfl:      lidocaine (LIDODERM) 5 %, Place 1 patch on the skin 2 (two) times a day., Disp: , Rfl:      magnesium hydroxide (MILK OF MAG) 400 mg/5 mL Susp suspension, Take 15 mL by mouth daily as needed., Disp: , Rfl:      nicotine polacrilex (NICORETTE) 4 MG gum, Take 4 mg by mouth every hour as needed., Disp: , Rfl:      pantoprazole (PROTONIX) 40 MG tablet, Take 40 mg by mouth daily., Disp: , Rfl:      vit A,C and E-lutein-minerals 1,000 unit-200 mg-60 unit-2 mg Tab, Take 1 tablet by mouth daily., Disp: , Rfl:      .There were no vitals filed  for this visit.  Blood pressure 136/72 pulse 79 respirations 18 temperature 98.6.  Physical Exam  HENT:   Head: Normocephalic.    Neck: Neck supple. No thyromegaly present.   Cardiovascular: Normal rate, regular rhythm and normal heart sounds.    PVC   Pulmonary/Chest: Breath sounds normal.   Abdominal: Bowel sounds are normal. There is no tenderness. There is no guarding.   Musculoskeletal:   Generalized weakness.  Wheelchair.  Denies pain   Lymphadenopathy:     She has no cervical adenopathy.   Neurological: She is alert.     LABS:   No results found for: WBC, HGB, HCT, MCV, PLT  No results found for this or any previous visit.          ASSESSMENT:      ICD-10-CM    1. Dermatitis L30.9    2. Hypoxia R09.02    3. Hypertension I10    4. COPD (chronic obstructive pulmonary disease) J44.9        PLAN:    The rash does seem to be slowly resolving.  She also was put on Tamiflu prophylactically due to an outbreak in the facility.  Hoping that therapy will get back on hold with me regarding what their actual goals are while she is on the transitional care unit.  Continue to manage and follow.        Electronically signed by: Michael Duane Johnson, CNP

## 2021-06-16 PROBLEM — I48.91 A-FIB (H): Status: ACTIVE | Noted: 2018-01-09

## 2021-06-16 PROBLEM — J44.9 COPD (CHRONIC OBSTRUCTIVE PULMONARY DISEASE) (H): Status: ACTIVE | Noted: 2018-01-09

## 2021-06-16 PROBLEM — I10 HYPERTENSION: Status: ACTIVE | Noted: 2018-01-09

## 2021-06-16 PROBLEM — R53.1 GENERAL WEAKNESS: Status: ACTIVE | Noted: 2018-01-09

## 2021-06-16 PROBLEM — R09.02 HYPOXIA: Status: ACTIVE | Noted: 2018-01-09
